# Patient Record
(demographics unavailable — no encounter records)

---

## 2024-11-15 NOTE — DISCUSSION/SUMMARY
[Reviewed Clinical Lab Test(s)] : Results of clinical tests were reviewed. [Reviewed Radiology Report(s)] : Radiology reports were reviewed. [FreeTextEntry1] : I met with the pt in [facility]. Seen with DEBBIE. D/W DANDRE. -We discussed the treatment undergone, the examination, and the planned surveillance.  -We disc the prior Pap. We await the Pap from today. -We disc BHM, and that this is attended to by the PCP.   -We have disc her hernia, which remains asx, and we disc sx to watch for and report.  -Her instructions were reviewed. -All Q/A. -She will return in 12m in [facility] with DEBBIE and DANDRE, at pt request. -Effort for the visit includes the note prep, review of prior material, interview, exam, further documentation, and coordination of care.

## 2024-11-15 NOTE — HISTORY OF PRESENT ILLNESS
[FreeTextEntry1] : Ms. 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  11/27/19 - 1/2/20 : 6 cycles weekly Cisplatin concurrent with radiation. 11/27/19 - 1/10-20 - IMRT Pelvis/Cervix + Parametrial boost to a total dose of 5,040cGy in 28 fractions.  1/3/20 - 1/15/20 - T&O to a total dose of 2,800cGy in 4 fractions.   3/22/24 She presents today in [facility] for routine follow-up. 6/16/2023 PAP negative for intraepithelial lesion or malignancy.  today, She is feeling well. She did have a UTI in March which was treated with antibiotics, became symptomatic again and was retreated with antibiotics.  11/15/2024 She presents today for [facility], feeling overall well with no complaints

## 2024-11-15 NOTE — HISTORY OF PRESENT ILLNESS
[Disease: _____________________] : Disease: [unfilled] [AJCC Stage: ____] : AJCC Stage: [unfilled] [de-identified] : 80 y.o female with newly diagnosed cervical cancer, here with her daughters and son-in-law to discuss treatment.\par  \[facility]  Patient presented to her PMD (Dr. Shell) with post menopausal bleeding and intermittent pelvic pressure which she noticed in July/August 2019. In September, she developed heavy vaginal bleeding and abdominal pain. She saw her PMD and was sent for imaging.\[facility]  \[facility]  CT abdomen/pelvis 9/7/19 showed abnormal distention of uterine cavity, measuring approximately 5 cm. This was due to obstructive process along endocervical/cervical canal region where there was ill defined enlarged heterogenous masslike density, spanning as wide as 5.5 cm along cervical region. There was associated bulging along anterior aspect of cervix indenting posterior bladder all. There were multiple prominent nodes in the retroperitoneum. Those along left common iliac vessels measured up to 16 x 10 mm. PET/CT was suggested.\[facility]  \[facility]  She saw Dr. Clarke on 9/18/19. On his exam, cervix was dilated with friable mass distending and filling the os. Cervix was distended to 5 - 6 cm. Parametria were shortened but not nodular. Cervical biopsy on 9/18/19 showed invasive poorly differentiated basaloid squamous cell carcinoma,positive for P40 and P16.\[facility]  \[facility]  She saw Dr. Bolden on 9/23/19 and underwent a urology work up. Cystoscopy( 9/29/19) was negative for bladder involvement.\[facility]  \[facility]  PET/CT 9/25/19 showed a hypermetabolic masslike density of the lower uterine segment measuring 6.4 x 4.9 cm (SUV 14.9) with obstruction of the endocervical/cervical canal, causing distention of the uterine cavity which is fluid-filled, with minimally FDG-avid uterine body wall (SUV 4.5). There were a few small retroperitoneal lymph nodes measuring up to 1.1 X 0.6 cm in the left common iliac region which were not FDG avid or too small to characterize.\[facility]  \[facility]  Patient is here to discuss chemosensitization. She has an appt with RT later today.\[facility]  \[facility]  Patient has h/o CLL,diagnosed in 2009. She was treated with Fludarabine and Rituxan, by Dr. Gonzalez. Patient achieved CR, and since then has been in surveillance.\par  \par  6/16/2023\par  Stage IB3 SCC Cx\par  EBRT - to the pelvis/cervix to a dose of 4,500 cGy and HDR brachytherapy with tandem and ovoids to a dose of 2800 cGy in 4 fractions from 11/27/2019 - 1/15/2020\par  Chemo (Cisplatin) - (C1 - 11/27/19 -- C6 - 1/2/20)\par  RT: Dr ERASTO Brown (was Dr GERARD Marcus)\par  MO: Dr Mckeon\par  Referred by (PCP) Dr. Shell\par  GI: Dr. Yu\par  \par  She RTO feeling well. No VB or VD. No pain.\par  \par  \par  \par   [de-identified] : Pat is here for a follow up. She feels well. No new symptoms.

## 2024-11-15 NOTE — HISTORY OF PRESENT ILLNESS
[FreeTextEntry1] : Ms. 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  11/27/19 - 1/2/20 : 6 cycles weekly Cisplatin concurrent with radiation. 11/27/19 - 1/10-20 - IMRT Pelvis/Cervix + Parametrial boost to a total dose of 5,040cGy in 28 fractions.  1/3/20 - 1/15/20 - T&O to a total dose of 2,800cGy in 4 fractions.   3/22/24 She presents today in [facility] for routine follow-up. 6/16/2023 PAP negative for intraepithelial lesion or malignancy.  today, She is feeling well. She did have a UTI in March which was treated with antibiotics, became symptomatic again and was retreated with antibiotics.  11/15/2024 She presents today for [facility], feeling overall well with no complaints

## 2024-11-15 NOTE — HISTORY OF PRESENT ILLNESS
[FreeTextEntry1] : Stage IB3 SCC Cx EBRT - to the pelvis/cervix to a dose of 4,500 cGy and HDR brachytherapy with tandem and ovoids to a dose of 2800 cGy in 4 fractions from 11/27/2019 - 1/15/2020 Chemo (Cisplatin) - (C1 - 11/27/19 -- C6 - 1/2/20) RT: Dr ERASTO Brown (was Dr GERARD Marcus) MO: Dr Mckeon Referred by (PCP) Dr. Shell GI: Dr. Yu  She RTO feeling well. No VB or VD. No pain.  No GI or  concerns.    Pap Mar 2024 - Neg.    CC: Cologuard- 2019- negative. Colonoscopy- 2016- negative  BHM: directed by Dr Shell she again confirms.

## 2024-11-15 NOTE — PHYSICAL EXAM
[General Appearance - Alert] : alert [General Appearance - In No Acute Distress] : in no acute distress [] : no respiratory distress [Respiration, Rhythm And Depth] : normal respiratory rhythm and effort [Exaggerated Use Of Accessory Muscles For Inspiration] : no accessory muscle use [Arterial Pulses Normal] : the arterial pulses were normal [Heart Rate And Rhythm] : heart rate and rhythm were normal [Abdomen Soft] : soft [Nondistended] : nondistended [Normal External Genitalia] : normal external genitalia  [Normal Vaginal Cuff] : vaginal cuff without lesion or nodularity [Normal Vagina] : normal vagina without lesions or masses [Normal] : no joint swelling, no clubbing or cyanosis of the fingernails and muscle strength and tone were normal [Skin Color & Pigmentation] : normal skin color and pigmentation [No Focal Deficits] : no focal deficits [Oriented To Time, Place, And Person] : oriented to person, place, and time

## 2024-11-15 NOTE — PHYSICAL EXAM
[General Appearance - Alert] : alert [] : no respiratory distress [General Appearance - In No Acute Distress] : in no acute distress [Respiration, Rhythm And Depth] : normal respiratory rhythm and effort [Exaggerated Use Of Accessory Muscles For Inspiration] : no accessory muscle use [Heart Rate And Rhythm] : heart rate and rhythm were normal [Arterial Pulses Normal] : the arterial pulses were normal [Abdomen Soft] : soft [Nondistended] : nondistended [Normal External Genitalia] : normal external genitalia  [Normal Vaginal Cuff] : vaginal cuff without lesion or nodularity [Normal Vagina] : normal vagina without lesions or masses [Normal] : no joint swelling, no clubbing or cyanosis of the fingernails and muscle strength and tone were normal [Skin Color & Pigmentation] : normal skin color and pigmentation [No Focal Deficits] : no focal deficits [Oriented To Time, Place, And Person] : oriented to person, place, and time

## 2024-11-15 NOTE — HISTORY OF PRESENT ILLNESS
[FreeTextEntry1] : Ms. 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  11/27/19 - 1/2/20 : 6 cycles weekly Cisplatin concurrent with radiation. 11/27/19 - 1/10-20 - IMRT Pelvis/Cervix + Parametrial boost to a total dose of 5,040cGy in 28 fractions.  1/3/20 - 1/15/20 - T&O to a total dose of 2,800cGy in 4 fractions.   3/22/24 She presents today in [facility] for routine follow-up. 6/16/2023 PAP negative for intraepithelial lesion or malignancy.  today, She is feeling well. She did have a UTI in March which was treated with antibiotics, became symptomatic again and was retreated with antibiotics.  11/15/2024 She presents today for [facility], feeling overall well with no complaints

## 2024-11-15 NOTE — PHYSICAL EXAM
[General Appearance - Alert] : alert [General Appearance - In No Acute Distress] : in no acute distress [] : no respiratory distress [Respiration, Rhythm And Depth] : normal respiratory rhythm and effort [Exaggerated Use Of Accessory Muscles For Inspiration] : no accessory muscle use [Heart Rate And Rhythm] : heart rate and rhythm were normal [Arterial Pulses Normal] : the arterial pulses were normal [Abdomen Soft] : soft [Nondistended] : nondistended [Normal External Genitalia] : normal external genitalia  [Normal Vaginal Cuff] : vaginal cuff without lesion or nodularity [Normal Vagina] : normal vagina without lesions or masses [Normal] : no joint swelling, no clubbing or cyanosis of the fingernails and muscle strength and tone were normal [Skin Color & Pigmentation] : normal skin color and pigmentation [No Focal Deficits] : no focal deficits [Oriented To Time, Place, And Person] : oriented to person, place, and time

## 2024-11-15 NOTE — PHYSICAL EXAM
[Chaperone Present] : A chaperone was present in the examining room during all aspects of the physical examination [Absent] : CVAT: absent [Abnormal] : Cervix: Abnormal [Normal] : Recto-Vaginal Exam: Normal [[id number]] : A chaperone was present during the pelvic exam. [FreeTextEntry2] : Gabrielle [de-identified] : Trace edema [de-identified] : s/nt [de-identified] : JUAN DAVID nuno [de-identified] : Shortened with RT changes [de-identified] : Apical scarring; atrophy, RT change.  [de-identified] : The uterus was nonpalpable [de-identified] : The adnexae were nonpalpable [de-identified] : No nodularity [de-identified] : rola

## 2024-11-15 NOTE — REVIEW OF SYSTEMS
[Anal Pain: Grade 0] : Anal Pain: Grade 0 [Constipation: Grade 0] : Constipation: Grade 0 [Fatigue: Grade 0] : Fatigue: Grade 0 [Urinary Incontinence: Grade 0] : Urinary Incontinence: Grade 0  [Urinary Retention: Grade 0] : Urinary Retention: Grade 0 [Genital Edema: Grade 0] : Genital Edema: Grade 0 [Negative] : Allergic/Immunologic